# Patient Record
Sex: FEMALE | Race: WHITE
[De-identification: names, ages, dates, MRNs, and addresses within clinical notes are randomized per-mention and may not be internally consistent; named-entity substitution may affect disease eponyms.]

---

## 2018-06-17 ENCOUNTER — HOSPITAL ENCOUNTER (EMERGENCY)
Dept: HOSPITAL 12 - ER | Age: 18
Discharge: HOME | End: 2018-06-17
Payer: COMMERCIAL

## 2018-06-17 VITALS — WEIGHT: 121 LBS | BODY MASS INDEX: 20.66 KG/M2 | HEIGHT: 64 IN

## 2018-06-17 VITALS — DIASTOLIC BLOOD PRESSURE: 78 MMHG | SYSTOLIC BLOOD PRESSURE: 125 MMHG

## 2018-06-17 DIAGNOSIS — S01.81XA: Primary | ICD-10-CM
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
## 2018-06-17 DIAGNOSIS — Z91.013: ICD-10-CM

## 2018-06-17 PROCEDURE — A4663 DIALYSIS BLOOD PRESSURE CUFF: HCPCS

## 2018-06-17 PROCEDURE — 12013 RPR F/E/E/N/L/M 2.6-5.0 CM: CPT

## 2018-06-17 PROCEDURE — 99283 EMERGENCY DEPT VISIT LOW MDM: CPT

## 2018-06-17 NOTE — NUR
Patient discharged to home in stable conditon.  Written and verbal after care 
instructions given. 

Patient and grandmother on the phone per patient verbalizes understanding of 
instructions.